# Patient Record
Sex: MALE | NOT HISPANIC OR LATINO | Employment: OTHER | URBAN - NONMETROPOLITAN AREA
[De-identification: names, ages, dates, MRNs, and addresses within clinical notes are randomized per-mention and may not be internally consistent; named-entity substitution may affect disease eponyms.]

---

## 2017-05-01 ENCOUNTER — OFFICE VISIT (OUTPATIENT)
Dept: GASTROENTEROLOGY | Facility: CLINIC | Age: 62
End: 2017-05-01

## 2017-05-01 VITALS
BODY MASS INDEX: 23.05 KG/M2 | HEIGHT: 70 IN | TEMPERATURE: 99 F | DIASTOLIC BLOOD PRESSURE: 98 MMHG | WEIGHT: 161 LBS | RESPIRATION RATE: 15 BRPM | SYSTOLIC BLOOD PRESSURE: 147 MMHG | HEART RATE: 92 BPM

## 2017-05-01 DIAGNOSIS — K70.30 ALCOHOLIC CIRRHOSIS OF LIVER WITHOUT ASCITES (HCC): Primary | ICD-10-CM

## 2017-05-01 DIAGNOSIS — R12 HEARTBURN: ICD-10-CM

## 2017-05-01 DIAGNOSIS — Z12.11 COLON CANCER SCREENING: ICD-10-CM

## 2017-05-01 PROCEDURE — 99214 OFFICE O/P EST MOD 30 MIN: CPT | Performed by: INTERNAL MEDICINE

## 2017-05-01 RX ORDER — OMEPRAZOLE 40 MG/1
40 CAPSULE, DELAYED RELEASE ORAL DAILY
COMMUNITY
Start: 2015-07-20

## 2017-05-01 RX ORDER — METOPROLOL SUCCINATE 25 MG/1
25 TABLET, EXTENDED RELEASE ORAL DAILY
COMMUNITY
Start: 2017-04-26

## 2017-05-01 RX ORDER — DIGOXIN 125 MCG
125 TABLET ORAL
COMMUNITY
Start: 2017-03-11

## 2017-05-01 RX ORDER — ALBUTEROL SULFATE 90 UG/1
AEROSOL, METERED RESPIRATORY (INHALATION)
COMMUNITY
Start: 2015-07-20

## 2017-05-01 RX ORDER — SULFACETAMIDE SODIUM 100 MG/G
OINTMENT OPHTHALMIC
COMMUNITY
Start: 2015-07-20 | End: 2017-06-26

## 2017-05-01 RX ORDER — TRAZODONE HYDROCHLORIDE 50 MG/1
TABLET ORAL
COMMUNITY
Start: 2015-07-20 | End: 2017-06-26

## 2017-05-01 RX ORDER — CALCIUM CARBONATE/VITAMIN D3 500-10/5ML
50 LIQUID (ML) ORAL DAILY
COMMUNITY
Start: 2015-07-20

## 2017-05-01 RX ORDER — PRIMIDONE 50 MG/1
50 TABLET ORAL NIGHTLY
COMMUNITY
Start: 2017-03-24

## 2017-05-01 RX ORDER — CETIRIZINE HYDROCHLORIDE 10 MG/1
10 TABLET ORAL DAILY PRN
COMMUNITY
Start: 2015-07-20

## 2017-05-30 ENCOUNTER — OFFICE VISIT (OUTPATIENT)
Dept: NEUROLOGY | Facility: CLINIC | Age: 62
End: 2017-05-30

## 2017-05-30 VITALS
HEART RATE: 89 BPM | BODY MASS INDEX: 23.05 KG/M2 | HEIGHT: 70 IN | WEIGHT: 161 LBS | OXYGEN SATURATION: 97 % | DIASTOLIC BLOOD PRESSURE: 88 MMHG | SYSTOLIC BLOOD PRESSURE: 142 MMHG

## 2017-05-30 DIAGNOSIS — E08.43 DIABETES MELLITUS DUE TO UNDERLYING CONDITION WITH DIABETIC AUTONOMIC NEUROPATHY, WITHOUT LONG-TERM CURRENT USE OF INSULIN (HCC): ICD-10-CM

## 2017-05-30 DIAGNOSIS — G62.9 POLYNEUROPATHY: Primary | ICD-10-CM

## 2017-05-30 DIAGNOSIS — Z13.1 ENCOUNTER FOR SCREENING FOR DIABETES MELLITUS: ICD-10-CM

## 2017-05-30 DIAGNOSIS — D53.9 MACROCYTIC ANEMIA: ICD-10-CM

## 2017-05-30 PROCEDURE — 99204 OFFICE O/P NEW MOD 45 MIN: CPT | Performed by: PSYCHIATRY & NEUROLOGY

## 2017-05-30 RX ORDER — VITAMIN B COMPLEX
1 CAPSULE ORAL DAILY
COMMUNITY

## 2017-06-01 ENCOUNTER — OFFICE VISIT (OUTPATIENT)
Dept: UROLOGY | Facility: CLINIC | Age: 62
End: 2017-06-01

## 2017-06-01 VITALS
TEMPERATURE: 98.8 F | BODY MASS INDEX: 22.9 KG/M2 | DIASTOLIC BLOOD PRESSURE: 80 MMHG | HEART RATE: 93 BPM | WEIGHT: 160 LBS | HEIGHT: 70 IN | OXYGEN SATURATION: 94 % | SYSTOLIC BLOOD PRESSURE: 127 MMHG

## 2017-06-01 DIAGNOSIS — R35.1 NOCTURIA MORE THAN TWICE PER NIGHT: ICD-10-CM

## 2017-06-01 DIAGNOSIS — R60.0 LOCALIZED EDEMA: ICD-10-CM

## 2017-06-01 DIAGNOSIS — N40.1 BPH (BENIGN PROSTATIC HYPERTROPHY) WITH URINARY OBSTRUCTION: Primary | ICD-10-CM

## 2017-06-01 DIAGNOSIS — N13.8 BPH (BENIGN PROSTATIC HYPERTROPHY) WITH URINARY OBSTRUCTION: Primary | ICD-10-CM

## 2017-06-01 PROBLEM — K74.60 CIRRHOSIS (HCC): Status: ACTIVE | Noted: 2017-06-01

## 2017-06-01 PROBLEM — K63.5 COLON POLYP: Status: ACTIVE | Noted: 2017-06-01

## 2017-06-01 PROBLEM — R56.9 ATTACK, EPILEPTIFORM (HCC): Status: ACTIVE | Noted: 2017-06-01

## 2017-06-01 PROBLEM — K55.20 ANGIODYSPLASIA OF COLON: Status: ACTIVE | Noted: 2017-06-01

## 2017-06-01 PROBLEM — R53.83 FATIGUE: Status: ACTIVE | Noted: 2017-06-01

## 2017-06-01 PROBLEM — IMO0001 BRASH: Status: ACTIVE | Noted: 2017-06-01

## 2017-06-01 PROBLEM — K31.84 GASTRIC ATONY: Status: ACTIVE | Noted: 2017-06-01

## 2017-06-01 PROBLEM — K57.30 COLON, DIVERTICULOSIS: Status: ACTIVE | Noted: 2017-06-01

## 2017-06-01 PROCEDURE — 76857 US EXAM PELVIC LIMITED: CPT | Performed by: UROLOGY

## 2017-06-01 PROCEDURE — 99203 OFFICE O/P NEW LOW 30 MIN: CPT | Performed by: UROLOGY

## 2017-06-01 NOTE — PROGRESS NOTES
Chief Complaint  Consult (prostate exam.)        CARRINGTON Douglas is a 62 y.o. male who is today requesting evaluation of his prostate.  Is certainly important considering his brother had prostate cancer at a much younger age than Malick.  He does have mild symptoms of outlet obstruction with primary concern of nocturia.  He states the recent evaluation including a PSA of 0.8.  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  He seems to smell like alcohol today but states he only drinks 2 beers a day.    Vitals:    06/01/17 1048   BP: 127/80   Pulse: 93   Temp: 98.8 °F (37.1 °C)   SpO2: 94%       Past Medical History  Past Medical History:   Diagnosis Date   • Abnormal LFTs    • Alcoholic cirrhosis of liver    • Arthritis    • Atrial fibrillation    • COPD (chronic obstructive pulmonary disease)    • Glaucoma    • H/O seasonal allergies    • History of blood transfusion    • HTN (hypertension)    • Insomnia    • Pulmonary HTN    • Schatzki's ring    • Seizures    • Skin cancer    • Temporary low platelet count    • Vitamin B12 deficiency        Past Surgical History  Past Surgical History:   Procedure Laterality Date   • EYE SURGERY     • LIVER BIOPSY     • LUNG SURGERY     • SKIN LESION EXCISION     • TONSILLECTOMY         Medications  has a current medication list which includes the following prescription(s): albuterol, aspirin, vitamin b complex, cetirizine, cyanocobalamin, digoxin, folic acid, gabapentin enacarbil er, magnesium oxide, magnesium oxide, metoprolol succinate xl, multiple vitamins-minerals, omeprazole, primidone, sulfacetamide, trazodone, and zinc.      Allergies  Allergies   Allergen Reactions   • Penicillins        Social History  Social History     Social History Narrative       Family History  He has no family history of bladder or kidney cancer  He has no family history of kidney stones      AUA Symptom Score:      Review of Systems  Review of  Systems  Positive for fatigue palpitations joint pain seizures and easy bruising but negative and other categories.  Physical Exam  Physical Exam   Constitutional: He is oriented to person, place, and time. He appears well-developed and well-nourished.   HENT:   Head: Normocephalic and atraumatic.   Neck: Normal range of motion.   Pulmonary/Chest: Effort normal. No respiratory distress.   Abdominal: Soft. He exhibits no distension and no mass. There is no tenderness. No hernia.   Genitourinary: Rectum normal, prostate normal and penis normal.   Musculoskeletal: Normal range of motion. He exhibits edema.   Lymphadenopathy:     He has no cervical adenopathy.   Neurological: He is alert and oriented to person, place, and time. Coordination abnormal.   Skin: Skin is warm and dry.   Psychiatric: He has a normal mood and affect. His behavior is normal.   Vitals reviewed.      Labs Recent and today in the office:  Results for orders placed or performed during the hospital encounter of 07/31/14   CBC and Differential   Result Value Ref Range    WBC 3.7 (L) 4.8 - 10.8 THOUS    RBC 4.33 (L) 4.70 - 6.10 m/uL    Hemoglobin 14.6 14.0 - 18.0 g/dL    Hematocrit 44 42 - 52 %    .5 (H) 80.0 - 94.0 fL    MCH 33.6 (H) 27.0 - 31.0 uug    MCHC 33.5 30.0 - 37.0 g/dL    RDW 12.9 11.5 - 14.5 %    Platelets 95 (L) 130 - 400 THOUS    Neutrophil Rel % 37.1 37.0 - 80.0 %    Lymphocyte Rel % 48.1 10.0 - 50.0 %    Monocyte Rel % 12.5 (H) 0.0 - 12.0 %    Eosinophil Rel % 1.5 0.0 - 7.0 %    Basophil Rel % 0.80 0.00 - 2.50 %    Neutrophils Absolute 1.40 (L) 2.00 - 6.90 THOUS    Lymphocytes Absolute 1.70 0.60 - 3.40 THOUS    Monocytes Absolute 0.50 0.00 - 0.90 THOUS    Eosinophils Absolute 0.10 0.00 - 0.70 THOUS    Basophils Absolute 0.00 0.00 - 0.20 THOUS   Basic metabolic panel   Result Value Ref Range    Sodium 150 (H) 137 - 145 mmol/L    Potassium 3.9 3.5 - 5.1 mmol/L    Chloride 106 98 - 107 mmol/L    CO2 33 (H) 26 - 30 mmol/L    Anion  Gap 15 10 - 20 mmol/L    BUN 3 (L) 7 - 20 mg/dL    Creatinine 0.7 0.6 - 1.3 mg/dL    eGFR >60 mL/min    Glucose 155 (H) 74 - 98 mg/dL    Calcium 8.3 (L) 8.4 - 10.2 mg/dL   Magnesium   Result Value Ref Range    Magnesium 1.2 (C) 1.6 - 2.3 mg/dL   Digoxin level   Result Value Ref Range    Digoxin <0.4 (L) 0.8 - 2.0 ng/mL         Assessment & Plan  Pelvic ultrasound reveals 3 ounces of postvoid residual and a 38 g prostate.  Digital rectal exam is benign and underestimated the size of his gland.    I feel the nocturia is multifactorial with his lower extremity  edema and increased void residual and he's informed about Flomax but declines.    I be happy to monitor this however and intervene when he is ready.  I recommended he return in 6 months for follow-up.

## 2017-06-01 NOTE — PROGRESS NOTES
[facility]- UROLOGY  [address], [address]  [place], [place] [address]  [phone]      06/01/2017    Malick HERNANDEZ Misael  1955        Pelvic Ultrasound with PVR    A transabdominal pelvic ultrasound was performed using a 3.5 MHz transducer of a B-K Adames through the suprapubic area.     The purpose of the study was to investigate agents voiding difficulty.  There was mild bladder wall thickening noted.  There were no intravesical filling defects seen.  The post void residual of 69.1 ml was noted.  Prostate was homogeneous and was noted to be 37.7 grams.  There was a protrusion of the prostate into the bladder.  Ultrasound images will be scanned into the chart for reference.       CPT code 59534        Performed by Tomer Cristina MD

## 2017-06-01 NOTE — TELEPHONE ENCOUNTER
Pt called stating that he would like to have the Horizant sent in to [facility].    Sent to [facility]

## 2017-06-08 PROBLEM — J44.9 COPD (CHRONIC OBSTRUCTIVE PULMONARY DISEASE) (HCC): Status: ACTIVE | Noted: 2017-06-08

## 2017-06-08 PROBLEM — K31.84 GASTROPARESIS: Status: ACTIVE | Noted: 2017-06-08

## 2017-06-08 PROBLEM — M19.90 ARTHRITIS: Status: ACTIVE | Noted: 2017-06-08

## 2017-06-08 PROBLEM — I48.91 ATRIAL FIBRILLATION (HCC): Status: ACTIVE | Noted: 2017-06-08

## 2017-06-21 ENCOUNTER — PREP FOR SURGERY (OUTPATIENT)
Dept: OTHER | Facility: HOSPITAL | Age: 62
End: 2017-06-21

## 2017-06-21 DIAGNOSIS — Z12.11 COLON CANCER SCREENING: Primary | ICD-10-CM

## 2017-06-21 RX ORDER — SODIUM CHLORIDE 0.9 % (FLUSH) 0.9 %
1-10 SYRINGE (ML) INJECTION AS NEEDED
Status: CANCELLED | OUTPATIENT
Start: 2017-06-21

## 2017-06-21 RX ORDER — SODIUM CHLORIDE 9 MG/ML
70 INJECTION, SOLUTION INTRAVENOUS CONTINUOUS PRN
Status: CANCELLED | OUTPATIENT
Start: 2017-06-21

## 2017-07-03 ENCOUNTER — ANESTHESIA EVENT (OUTPATIENT)
Dept: GASTROENTEROLOGY | Facility: HOSPITAL | Age: 62
End: 2017-07-03

## 2017-07-03 ENCOUNTER — HOSPITAL ENCOUNTER (OUTPATIENT)
Facility: HOSPITAL | Age: 62
Setting detail: HOSPITAL OUTPATIENT SURGERY
Discharge: HOME | End: 2017-07-03
Attending: INTERNAL MEDICINE | Admitting: INTERNAL MEDICINE

## 2017-07-03 ENCOUNTER — ANESTHESIA (OUTPATIENT)
Dept: GASTROENTEROLOGY | Facility: HOSPITAL | Age: 62
End: 2017-07-03

## 2017-07-03 VITALS
HEART RATE: 68 BPM | TEMPERATURE: 98.9 F | SYSTOLIC BLOOD PRESSURE: 157 MMHG | OXYGEN SATURATION: 96 % | HEIGHT: 70 IN | BODY MASS INDEX: 22.9 KG/M2 | RESPIRATION RATE: 18 BRPM | DIASTOLIC BLOOD PRESSURE: 99 MMHG | WEIGHT: 160 LBS

## 2017-07-03 DIAGNOSIS — Z12.11 COLON CANCER SCREENING: ICD-10-CM

## 2017-07-03 PROCEDURE — 88305 TISSUE EXAM BY PATHOLOGIST: CPT | Performed by: INTERNAL MEDICINE

## 2017-07-03 PROCEDURE — 25010000002 PROPOFOL 10 MG/ML EMULSION: Performed by: NURSE ANESTHETIST, CERTIFIED REGISTERED

## 2017-07-03 PROCEDURE — 25010000002 MEPERIDINE PER 100 MG: Performed by: NURSE ANESTHETIST, CERTIFIED REGISTERED

## 2017-07-03 PROCEDURE — 45384 COLONOSCOPY W/LESION REMOVAL: CPT | Performed by: INTERNAL MEDICINE

## 2017-07-03 PROCEDURE — 25010000002 MIDAZOLAM PER 1 MG: Performed by: NURSE ANESTHETIST, CERTIFIED REGISTERED

## 2017-07-03 PROCEDURE — S0260 H&P FOR SURGERY: HCPCS | Performed by: INTERNAL MEDICINE

## 2017-07-03 DEVICE — CLIPPING DEVICE
Type: IMPLANTABLE DEVICE | Status: FUNCTIONAL
Brand: RESOLUTION CLIP

## 2017-07-03 RX ORDER — MEPERIDINE HYDROCHLORIDE 50 MG/ML
INJECTION INTRAMUSCULAR; INTRAVENOUS; SUBCUTANEOUS AS NEEDED
Status: DISCONTINUED | OUTPATIENT
Start: 2017-07-03 | End: 2017-07-03 | Stop reason: SURG

## 2017-07-03 RX ORDER — PROPOFOL 10 MG/ML
VIAL (ML) INTRAVENOUS AS NEEDED
Status: DISCONTINUED | OUTPATIENT
Start: 2017-07-03 | End: 2017-07-03 | Stop reason: SURG

## 2017-07-03 RX ORDER — MIDAZOLAM HYDROCHLORIDE 1 MG/ML
INJECTION INTRAMUSCULAR; INTRAVENOUS AS NEEDED
Status: DISCONTINUED | OUTPATIENT
Start: 2017-07-03 | End: 2017-07-03 | Stop reason: SURG

## 2017-07-03 RX ORDER — SODIUM CHLORIDE 0.9 % (FLUSH) 0.9 %
1-10 SYRINGE (ML) INJECTION AS NEEDED
Status: DISCONTINUED | OUTPATIENT
Start: 2017-07-03 | End: 2017-07-03 | Stop reason: HOSPADM

## 2017-07-03 RX ORDER — SODIUM CHLORIDE 9 MG/ML
70 INJECTION, SOLUTION INTRAVENOUS CONTINUOUS PRN
Status: DISCONTINUED | OUTPATIENT
Start: 2017-07-03 | End: 2017-07-03 | Stop reason: HOSPADM

## 2017-07-03 RX ADMIN — Medication 25 MG: at 09:46

## 2017-07-03 RX ADMIN — MEPERIDINE HYDROCHLORIDE 50 MG: 50 INJECTION INTRAMUSCULAR; INTRAVENOUS; SUBCUTANEOUS at 09:37

## 2017-07-03 RX ADMIN — MIDAZOLAM HYDROCHLORIDE 2 MG: 1 INJECTION, SOLUTION INTRAMUSCULAR; INTRAVENOUS at 09:37

## 2017-07-03 RX ADMIN — PROPOFOL 20 MG: 10 INJECTION, EMULSION INTRAVENOUS at 09:56

## 2017-07-03 RX ADMIN — PROPOFOL 20 MG: 10 INJECTION, EMULSION INTRAVENOUS at 09:52

## 2017-07-03 RX ADMIN — PROPOFOL 20 MG: 10 INJECTION, EMULSION INTRAVENOUS at 09:54

## 2017-07-03 RX ADMIN — Medication 25 MG: at 09:38

## 2017-07-03 RX ADMIN — SODIUM CHLORIDE 70 ML/HR: 9 INJECTION, SOLUTION INTRAVENOUS at 08:45

## 2017-07-03 RX ADMIN — PROPOFOL 40 MG: 10 INJECTION, EMULSION INTRAVENOUS at 09:40

## 2017-07-03 RX ADMIN — PROPOFOL 20 MG: 10 INJECTION, EMULSION INTRAVENOUS at 10:00

## 2017-07-03 RX ADMIN — MIDAZOLAM HYDROCHLORIDE 2 MG: 1 INJECTION, SOLUTION INTRAMUSCULAR; INTRAVENOUS at 10:02

## 2017-07-03 RX ADMIN — PROPOFOL 40 MG: 10 INJECTION, EMULSION INTRAVENOUS at 09:43

## 2017-07-03 RX ADMIN — PROPOFOL 20 MG: 10 INJECTION, EMULSION INTRAVENOUS at 09:58

## 2017-07-03 RX ADMIN — PROPOFOL 20 MG: 10 INJECTION, EMULSION INTRAVENOUS at 09:50

## 2017-07-03 RX ADMIN — PROPOFOL 40 MG: 10 INJECTION, EMULSION INTRAVENOUS at 09:46

## 2017-07-03 NOTE — H&P
As you know the patient has history of chronic liver disease-alcohol-related cirrhosis. He was diagnosed in 2008. The patient had a liver biopsy done at [facility]. Unfortunately, the patient continues to use alcoholic beverages. There is no history of darkening of urine color, or lightning of stool color or pruritus. Is no history of fever or chills. There is no history of weight loss.  The patient has significantly cut down his alcohol intake. Now he drinks on average perhaps 2 beers a week.     The patient has a history of reflux off and on for the last several years. The reflux is moderately severe. Symptoms are described as retrosternal burning sensation, and indigestion. There is history of occasional regurgitative symptoms. Frequency being several times per week. The symptoms are worse at night. The patient takes acid suppressive therapy with reasonable control of his symptoms.     Upon review of medical records dated 1 2013 the patient underwent an upper endoscopy which revealed short segment Yanes's appearing mucosa which was not biopsied. A tiny sliding hiatal hernia 1-2 cm, and erythematous gastritis was seen. A possible Schatzki's-type chronic ring could not be excluded. No gastroesophageal varices, or portal hypertensive gastropathy was noted. Changes consistent with gastroparesis were however seen.       Upon further review of records the patient had undergone a coloscopy to terminal ileum on October 12, 2011. He was found to have pandiverticulosis more pronounced in the left side, a colon polyp, scant vascular ectasia, and internal hemorrhoids. Biopsies from the colon polyp revealed changes consistent with tubular adenoma.       Review of Systems   Constitutional: Positive for unexpected weight change. Negative for appetite change, chills, fatigue and fever.   HENT: Negative for mouth sores, nosebleeds and trouble swallowing.   Eyes: Negative for discharge and redness.    Respiratory: Negative for apnea, cough and shortness of breath.   Cardiovascular: Negative for chest pain, palpitations and leg swelling.   Gastrointestinal: Negative for abdominal distention, abdominal pain, anal bleeding, blood in stool, constipation, diarrhea, nausea and vomiting.   Endocrine: Negative for cold intolerance, heat intolerance and polydipsia.   Genitourinary: Negative for dysuria, hematuria and urgency.   Musculoskeletal: Positive for arthralgias. Negative for joint swelling and myalgias.   Skin: Negative for rash.   Allergic/Immunologic: Positive for food allergies. Negative for immunocompromised state.   Neurological: Positive for dizziness. Negative for seizures, syncope and headaches.   Hematological: Negative for adenopathy. Bruises/bleeds easily.   Psychiatric/Behavioral: Negative for dysphoric mood. The patient is not nervous/anxious and is not hyperactive.      There is no problem list on file for this patient.      Medical History         Past Medical History:   Diagnosis Date   • Abnormal LFTs     • Alcoholic cirrhosis of liver     • Arthritis     • Atrial fibrillation     • COPD (chronic obstructive pulmonary disease)     • Glaucoma     • H/O seasonal allergies     • History of blood transfusion     • HTN (hypertension)     • Insomnia     • Schatzki's ring     • Seizures     • Skin cancer     • Temporary low platelet count     • Vitamin B12 deficiency            Surgical History          Past Surgical History:   Procedure Laterality Date   • EYE SURGERY       • LIVER BIOPSY       • LUNG SURGERY       • SKIN LESION EXCISION       • TONSILLECTOMY                   Family History   Problem Relation Age of Onset   • Colon cancer Neg Hx                Social History    Substance Use Topics    • Smoking status: Never Smoker    • Smokeless tobacco: Never Used    • Alcohol use Yes         Comment: socially          Current Outpatient Prescriptions:   • albuterol (PROVENTIL HFA;VENTOLIN HFA) 108  "(90 BASE) MCG/ACT inhaler, Albuterol AERS; Patient Sig: Albuterol AERS ; 0; 20-Jul-2015; Active, Disp: , Rfl:   • aspirin 81 MG tablet, Take 81 mg by mouth Daily., Disp: , Rfl:   • cetirizine (zyrTEC) 10 MG tablet, Take by mouth Daily., Disp: , Rfl:   • Cyanocobalamin (VITAMIN B-12 CR PO), , Disp: , Rfl:   • digoxin (LANOXIN) 125 MCG tablet, , Disp: , Rfl:   • FOLIC ACID PO, , Disp: , Rfl:   • Gabapentin, Once-Daily, 300 MG tablet, Take by mouth 3 (Three) Times a Day., Disp: , Rfl:   • magnesium oxide (MAGOX) 400 (241.3 MG) MG tablet tablet, TAKE 1 TABLET BY MOUTH TWICE DAILY, Disp: , Rfl: 3  • MAGNESIUM OXIDE PO, , Disp: , Rfl:   • metoprolol succinate XL (TOPROL-XL) 25 MG 24 hr tablet, , Disp: , Rfl:   • Multiple Vitamins-Minerals (COMPLETE SENIOR PO), , Disp: , Rfl:   • omeprazole (priLOSEC) 40 MG capsule, Take by mouth Daily., Disp: , Rfl:   • primidone (MYSOLINE) 50 MG tablet, , Disp: , Rfl:   • sulfacetamide (BLEPH-10) 10 % ophthalmic ointment, Apply to eye., Disp: , Rfl:   • traZODone (DESYREL) 50 MG tablet, Take by mouth., Disp: , Rfl:   • Zinc 30 MG capsule, Take by mouth., Disp: , Rfl:           Allergies   Allergen Reactions   • Penicillins        Blood pressure (!) 144/105, pulse 110, temperature 98.9 °F (37.2 °C), temperature source Tympanic, height 70\" (177.8 cm), weight 160 lb (72.6 kg), SpO2 96 %.       Physical Exam   Constitutional: He is oriented to person, place, and time. He appears well-developed and well-nourished. No distress.   HENT:   Head: Normocephalic and atraumatic.   Right Ear: Hearing and external ear normal.   Left Ear: Hearing and external ear normal.   Nose: Nose normal.   Mouth/Throat: Oropharynx is clear and moist and mucous membranes are normal. Mucous membranes are not pale, not dry and not cyanotic. No oral lesions. No oropharyngeal exudate.   Eyes: Conjunctivae and EOM are normal. Right eye exhibits no discharge. Left eye exhibits no discharge. No scleral icterus.   Neck: " Trachea normal. Neck supple. No JVD present. No edema present. No thyroid mass and no thyromegaly present.   Cardiovascular: Normal rate, regular rhythm, S2 normal and normal heart sounds. Exam reveals no gallop, no S3 and no friction rub.   No murmur heard.  Pulmonary/Chest: Effort normal and breath sounds normal. No respiratory distress. He has no wheezes. He has no rales. He exhibits no tenderness.   Abdominal: Soft. Normal appearance and bowel sounds are normal. He exhibits no distension, no ascites and no mass. There is no splenomegaly or hepatomegaly. There is no tenderness. There is no rigidity, no rebound and no guarding. No hernia.   Musculoskeletal: He exhibits no tenderness or deformity.     Vascular Status - His exam exhibits no right foot edema. His exam exhibits no left foot edema.  Lymphadenopathy:   He has no cervical adenopathy.   Left: No supraclavicular adenopathy present.   Neurological: He is alert and oriented to person, place, and time. He has normal strength. No cranial nerve deficit or sensory deficit. He exhibits normal muscle tone. Coordination normal.   Skin: No rash noted. He is not diaphoretic. No cyanosis. No pallor. Nails show no clubbing.   Psychiatric: He has a normal mood and affect. His behavior is normal. Judgment and thought content normal.   Nursing note and vitals reviewed.     Stigmata of chronic liver disease: Mild palmar erythema.   Asterixis: None.     Laboratory Testing:  Upon review of medical records:     Dated April 27, 2015 sodium 139 potassium 3.8 chloride 96 CO2 22 BUN 10 and serum creatinine 0.78 glucose 90 calcium 9.5. Albumin 4.0 T bili 3.3 AST 27 ALT 15 alkaline phosphatase 118.   WBC 7.4 hemoglobin 15.0 hematocrit 44.7 .6 platelet count 127,000  Vitamin B12 level 1407 pg/mL.     Dated June 26, 2015 sodium 137 potassium 4.1 chloride 100 CO2 28 BUN 9 serum creatinine 0.75 glucose 137 calcium 9.4. Albumin 3.8 T bili 3.1 AST 33 ALT 18 alkaline phosphatase  151.   Vitamin B1 level 26 mmol/L.  WBC 5.1 hemoglobin 14.8 hematocrit 44.4 .6 platelet count 126,000   Vitamin B12 level 1398 pg/mL  Serum folate >24.0.      Procedure  Upon review of medical records:     The patient had undergone a coloscopy to terminal ileum on October 12, 2011. He was found to have pandiverticulosis more pronounced in the left side, a colon polyp, scant vascular ectasia, and internal hemorrhoids. Biopsies from the colon polyp revealed changes consistent with tubular adenoma.      Dated April 1, 2013 the patient underwent an upper endoscopy which revealed short segment Yanes's appearing mucosa which was not biopsied. A tiny sliding hiatal hernia 1-2 cm, and erythematous gastritis was seen. A possible Schatzki's-type chronic ring could not be excluded. No gastroesophageal varices, or portal hypertensive gastropathy was noted. Changes consistent with gastroparesis were however seen.      Assessment and Plan:      Alcoholic cirrhosis of liver without ascites  Comments:  History of cirrhosis of the liver. The patient was diagnosed to have at [facility] in  2008 with liver biopsy. The likely etiology is alcohol.      Heartburn  Comments:  Stable.     Colon cancer screening  Comments:  History of colon polyps-tubular adenomata. Last colonoscopy in October 2011.      Colonoscopy: Description of the procedure, risks benefits alternatives and options including non-operative options were discussed with the patient in detail. The patient understands and wishes to proceed.

## 2017-07-03 NOTE — DISCHARGE INSTRUCTIONS
Diet:     Blood Thinner Directions:    ·  Avoid Aspirin & other NSAIDS for _3__ days.  Tylenol is okay.    Call [facility] at [phone] or come to the Emergency Department if you experience the following: Chest pain, abdominal pain, bleeding (vomiting of blood or coffee colored material, black stools or ariel blood in stools), fever/chills, nausea and vomiting or dizziness.      Follow-up:  DR. EDGAR BAY in 4 weeks.Office phone # [phone].

## 2017-07-03 NOTE — ANESTHESIA POSTPROCEDURE EVALUATION
Patient: Malick Douglas    Procedure Summary     Date Anesthesia Start Anesthesia Stop Room / Location    07/03/17 0932  [facility] ENDOSCOPY 2 / [facility] ENDOSCOPY       Procedure Diagnosis Surgeon Provider    COLONOSCOPY WITH HOT BIOPSY POLYPECTOMY AND REOLUTION CLIP  (N/A Anus) Colon cancer screening  (Colon cancer screening [Z12.11]) MD Tomer Link CRNA          Anesthesia Type: MAC  Last vitals  BP     Temp      Pulse     Resp      SpO2        Post Anesthesia Care and Evaluation    Patient location during evaluation: PHASE II  Patient participation: complete - patient participated  Level of consciousness: awake  Pain score: 0  Pain management: adequate  Airway patency: patent  Anesthetic complications: No anesthetic complications  PONV Status: none  Cardiovascular status: acceptable  Respiratory status: acceptable  Hydration status: acceptable    Comments: vsss resp spont, reflexes intact, responsive, report given to pacu nurse

## 2017-07-03 NOTE — ANESTHESIA PREPROCEDURE EVALUATION
Anesthesia Evaluation     Patient summary reviewed and Nursing notes reviewed   no history of anesthetic complications:  NPO Solid Status: > 8 hours  NPO Liquid Status: > 8 hours     Airway   Mallampati: I  TM distance: >3 FB  Neck ROM: full  no difficulty expected  Dental - normal exam     Pulmonary     breath sounds clear to auscultation  (+) COPD mild, decreased breath sounds,   (-) not a smoker  Cardiovascular - normal exam    Patient on routine beta blocker and Beta blocker given within 24 hours of surgery  Rhythm: regular  Rate: normal    (+) hypertension, valvular problems/murmurs, dysrhythmias Atrial Fib,     ROS comment: Leaking Mitral Valve  Does not take anticoagulant other than ASA  States cardiac cath 5 years ago, echo 5 mo ago and both were OK    Neuro/Psych  (+) seizures,      ROS Comment: Last petit mal seizure 1 year ago. Never had grand mal seizures  GI/Hepatic/Renal/Endo    (+)  GERD well controlled, liver disease,     ROS Comment: Cirrohsis secondary to ETOH    Musculoskeletal     Abdominal    Substance History   (+) alcohol use,       Comment: 4 beers a week. No hard liqour or wine. Was heavy drinker for for 30 years and slowed down drinking six years ago.   OB/GYN          Other   (+) arthritis   history of cancer      Other Comment: Hx skin cancer 10 years ago  ROS/Med Hx Other: prob tolerance to anes meds                              Anesthesia Plan    ASA 3     MAC   (Risks and benefits discussed including risk of aspiration, recall and dental damage. All patient questions answered. Will continue with POC.)  intravenous induction

## 2017-07-03 NOTE — PLAN OF CARE
Problem: GI Endoscopy (Adult)  Goal: Signs and Symptoms of Listed Potential Problems Will be Absent or Manageable (GI Endoscopy)  Outcome: Outcome(s) achieved Date Met:  07/03/17

## 2017-07-03 NOTE — NURSING NOTE
On returning to [facility] [place] [facility], this patient had red tinged saliva. On examination  He appeared to have a small spot on the  Inside of his lip which had since stopped bleeding.  Per his granddaughter, he has bitten his lip before.

## 2017-07-03 NOTE — OP NOTE
PROCEDURE:  Colonoscopy to the terminal ileum with one hot biopsy polypectomy, and placement of a resolution clip to coapt the margins.     DATE OF PROCEDURE:  July 3, 2017    REFERRING PROVIDER:  SUKUMAR Guaman     INSTRUMENT USED:  Olympus PCF H180 AL videocolonoscope.       INDICATIONS OF THE PROCEDURE:  This is a 62-year-old white male for colon cancer screening.  Previous colonoscopy 2011-polyps.     BIOPSIES:  Hot biopsy polypectomy was performed in the proximal descending colon.      PHOTOGRAPHS:  Photographs were included in the medical records.     MEDICATIONS:  MAC.       CONSENT/PREPROCEDURE EVALUATION:  Risks, benefits, alternatives and options of the procedure including risks of sedation/anesthesia were discussed with the patient and informed consent was obtained prior to the procedure.  History and physical examination were performed and nothing precluded the test.      REPORT:  The patient was placed in left lateral decubitus position and a digital examination was performed.  Once under the influence of IV sedation, the instrument was inserted into the rectum and advanced under direct vision to cecum which was identified by the ileocecal valve, triradiate folds and appendiceal orifice. The scope was then maneuvered into the terminal ileum.        FINDINGS:      Digital rectal examination:  Good anal tone.  No perianal pathology.  No mass.        Terminal ileum:  7-8 cm.  Normal.     Cecum and ascending colon: Scant vascular ectasia.  Scant diverticulosis.     Hepatic flexure, transverse colon, splenic flexure:  Scant diverticulosis.           Descending colon, sigmoid colon and rectum:  A 4-5 mm sessile polyp was noted at the proximal descending colon close to a diverticulum.  This was removed with hot biopsy forceps.  The margins were coapted using a resolution clip.  A retroflex examination within the rectum revealed internal hemorrhoids.        The scope was then straightened, the lower GI  tract was decompressed, and the scope was pulled out of the patient.  The patient tolerated the procedure well.  There were no immediate complications and the patient was transferred in stable condition for post procedure observation.      TECHNICAL DATA:   1. Sheridan prep score: 8 (3+2+3).     2. Difficulty of examination:  Average.   3. Anus to cecal time: 3 minutes.  4. Withdrawal time: 8 min.   5. Procedure Time: 21 min.   6. Retroflex examination in right colon: Yes.    7. Second look Rectum to cecum with decompression: Yes.    DIAGNOSES:    1. Pandiverticulosis more pronounced in the left colon.  2. Colon polyp.  3. Internal hemorrhoids.    RECOMMENDATIONS:     1. Follow biopsies.    2. Follow-up:    3-4 weeks.  3. Followup colonoscopy in 5 years.     4. Dietary instructions.     Thank you very much for letting me participate in the care of this patient. Please do not hesitate to call me if you have any questions.

## 2017-07-07 LAB
LAB AP CASE REPORT: NORMAL
Lab: NORMAL
PATH REPORT.FINAL DX SPEC: NORMAL

## (undated) DEVICE — JELLY,LUBE,STERILE,FLIP TOP,TUBE,2-OZ: Brand: MEDLINE

## (undated) DEVICE — PAD GRND REM POLYHESIVE A/ DISP

## (undated) DEVICE — ENDOGATOR AUXILIARY WATER JET CONNECTOR: Brand: ENDOGATOR

## (undated) DEVICE — FRCP BIOP RADLJAW4 HOT 2.2X240 BX40

## (undated) DEVICE — Device

## (undated) DEVICE — FIAPC® PROBE W/ FILTER 2200 SC OD 2.3MM/6.9FR; L 2.2M/7.2FT: Brand: ERBE